# Patient Record
Sex: MALE | Race: WHITE
[De-identification: names, ages, dates, MRNs, and addresses within clinical notes are randomized per-mention and may not be internally consistent; named-entity substitution may affect disease eponyms.]

---

## 2019-04-23 LAB
ALBUMIN SERPL-MCNC: 3.7 G/DL (ref 3.5–5)
ALBUMIN/GLOB SERPL: 1.2 {RATIO} (ref 0.8–2)
ALP SERPL-CCNC: 91 IU/L (ref 40–150)
ALT SERPL-CCNC: 23 IU/L (ref 0–55)
ANION GAP SERPL CALC-SCNC: 10.2 MMOL/L (ref 8–16)
BASOPHILS # BLD AUTO: 0 10*3/UL (ref 0–0.1)
BASOPHILS NFR BLD AUTO: 0.4 % (ref 0–1)
BUN SERPL-MCNC: 18 MG/DL (ref 7–26)
BUN/CREAT SERPL: 16 (ref 6–25)
CALCIUM SERPL-MCNC: 9.3 MG/DL (ref 8.4–10.2)
CHLORIDE SERPL-SCNC: 104 MMOL/L (ref 98–107)
CO2 SERPL-SCNC: 25 MMOL/L (ref 22–29)
DEPRECATED APTT PLAS QN: 26.9 SECONDS (ref 23.8–35.5)
DEPRECATED INR PLAS: 1
DEPRECATED NEUTROPHILS # BLD AUTO: 5 10*3/UL (ref 2.1–6.9)
EGFRCR SERPLBLD CKD-EPI 2021: > 60 ML/MIN (ref 60–?)
EOSINOPHIL # BLD AUTO: 0.1 10*3/UL (ref 0–0.4)
EOSINOPHIL NFR BLD AUTO: 1.5 % (ref 0–6)
ERYTHROCYTE [DISTWIDTH] IN CORD BLOOD: 13.6 % (ref 11.7–14.4)
GLOBULIN PLAS-MCNC: 3.2 G/DL (ref 2.3–3.5)
GLUCOSE SERPLBLD-MCNC: 229 MG/DL (ref 74–118)
HCT VFR BLD AUTO: 38.8 % (ref 38.2–49.6)
HGB BLD-MCNC: 13.6 G/DL (ref 14–18)
LYMPHOCYTES # BLD: 1.4 10*3/UL (ref 1–3.2)
LYMPHOCYTES NFR BLD AUTO: 19.5 % (ref 18–39.1)
MCH RBC QN AUTO: 30.4 PG (ref 28–32)
MCHC RBC AUTO-ENTMCNC: 35.1 G/DL (ref 31–35)
MCV RBC AUTO: 86.6 FL (ref 81–99)
MONOCYTES # BLD AUTO: 0.5 10*3/UL (ref 0.2–0.8)
MONOCYTES NFR BLD AUTO: 7.6 % (ref 4.4–11.3)
NEUTS SEG NFR BLD AUTO: 70.7 % (ref 38.7–80)
PLATELET # BLD AUTO: 174 X10E3/UL (ref 140–360)
POTASSIUM SERPL-SCNC: 4.2 MMOL/L (ref 3.5–5.1)
PROTHROMBIN TIME: 13.7 SECONDS (ref 11.9–14.5)
RBC # BLD AUTO: 4.48 X10E6/UL (ref 4.3–5.7)
SODIUM SERPL-SCNC: 135 MMOL/L (ref 136–145)

## 2019-04-26 ENCOUNTER — HOSPITAL ENCOUNTER (OUTPATIENT)
Dept: HOSPITAL 88 - OR | Age: 64
Setting detail: OBSERVATION
LOS: 1 days | Discharge: HOME | End: 2019-04-27
Attending: UROLOGY | Admitting: UROLOGY
Payer: COMMERCIAL

## 2019-04-26 VITALS — DIASTOLIC BLOOD PRESSURE: 61 MMHG | SYSTOLIC BLOOD PRESSURE: 101 MMHG

## 2019-04-26 VITALS — DIASTOLIC BLOOD PRESSURE: 87 MMHG | SYSTOLIC BLOOD PRESSURE: 156 MMHG

## 2019-04-26 VITALS — SYSTOLIC BLOOD PRESSURE: 141 MMHG | DIASTOLIC BLOOD PRESSURE: 76 MMHG

## 2019-04-26 VITALS — WEIGHT: 277.44 LBS | BODY MASS INDEX: 42.05 KG/M2 | HEIGHT: 68 IN

## 2019-04-26 VITALS — SYSTOLIC BLOOD PRESSURE: 165 MMHG | DIASTOLIC BLOOD PRESSURE: 90 MMHG

## 2019-04-26 DIAGNOSIS — R35.1: ICD-10-CM

## 2019-04-26 DIAGNOSIS — I83.893: ICD-10-CM

## 2019-04-26 DIAGNOSIS — E78.00: ICD-10-CM

## 2019-04-26 DIAGNOSIS — N40.3: Primary | ICD-10-CM

## 2019-04-26 DIAGNOSIS — R39.2: ICD-10-CM

## 2019-04-26 DIAGNOSIS — I48.91: ICD-10-CM

## 2019-04-26 DIAGNOSIS — M19.90: ICD-10-CM

## 2019-04-26 PROCEDURE — 82948 REAGENT STRIP/BLOOD GLUCOSE: CPT

## 2019-04-26 PROCEDURE — 71046 X-RAY EXAM CHEST 2 VIEWS: CPT

## 2019-04-26 PROCEDURE — 93005 ELECTROCARDIOGRAM TRACING: CPT

## 2019-04-26 PROCEDURE — 80053 COMPREHEN METABOLIC PANEL: CPT

## 2019-04-26 PROCEDURE — 76872 US TRANSRECTAL: CPT

## 2019-04-26 PROCEDURE — 55700: CPT

## 2019-04-26 PROCEDURE — 85025 COMPLETE CBC W/AUTO DIFF WBC: CPT

## 2019-04-26 PROCEDURE — 85610 PROTHROMBIN TIME: CPT

## 2019-04-26 PROCEDURE — 36415 COLL VENOUS BLD VENIPUNCTURE: CPT

## 2019-04-26 PROCEDURE — 88305 TISSUE EXAM BY PATHOLOGIST: CPT

## 2019-04-26 PROCEDURE — 85730 THROMBOPLASTIN TIME PARTIAL: CPT

## 2019-04-26 RX ADMIN — METRONIDAZOLE SCH MG: 500 TABLET ORAL at 21:01

## 2019-04-26 RX ADMIN — CEFOXITIN SODIUM SCH MLS/HR: 1 INJECTION, SOLUTION INTRAVENOUS at 18:45

## 2019-04-26 RX ADMIN — INSULIN HUMAN SCH UNIT: 100 INJECTION, SOLUTION PARENTERAL at 20:45

## 2019-04-26 RX ADMIN — LABETALOL HYDROCHLORIDE PRN MG: 5 INJECTION, SOLUTION INTRAVENOUS at 21:04

## 2019-04-26 RX ADMIN — SODIUM CHLORIDE, POTASSIUM CHLORIDE, SODIUM LACTATE AND CALCIUM CHLORIDE SCH MLS/HR: 600; 310; 30; 20 INJECTION, SOLUTION INTRAVENOUS at 22:33

## 2019-04-26 RX ADMIN — METRONIDAZOLE SCH MG: 500 TABLET ORAL at 15:52

## 2019-04-26 NOTE — OPERATIVE REPORT
DATE OF PROCEDURE:  04/26/2019

 

SURGEON:  Leroy Rain MD

 

PREOPERATIVE DIAGNOSIS:  Elevated prostate-specific antigen.

 

POSTOPERATIVE DIAGNOSIS:  Elevated prostate-specific antigen.

 

OPERATION PERFORMED:  Ultrasound-directed transrectal prostate biopsy.

 

ANESTHESIA:  IV sedation monitored anesthesia care.

 

INDICATIONS:  This patient is a 63-year-old white male, who had a prostate-specific

antigen of 9.2 and was referred for further evaluation and treatment.  Physical

examination revealed a moderately obstructing prostate, a moderately trabeculated

bladder and a nodular prostate.  The patient has a history of atrial fibrillation, had

cardiac clearance from Dr. Angeles.  He had been on Eliquis and Eliquis was stopped five

days prior to the surgery.  For further details, please refer to the history and

physical.  The procedure was done in following fashion. 

 

DESCRIPTION OF THE PROCEDURE:  The patient was taken to the operating room, placed under

IV sedation, monitored anesthesia care in the lateral decubitus position with the right

side up.  An ultrasound scan of the prostate was performed.  This revealed about 26 g

prostate.  Internal calcifications were identified.  The seminal vesicles were

identified and appeared normal.  Sextant biopsies of prostate were performed using the

Bard biopsy gun through the ultrasound probe.  Specimens were taken from the right base

lateral, then right base medial, then right mid lateral, then right mid medial, then

right apex lateral, then right apex medial, then left base lateral, then left base

medial, then left mid lateral, then left mid medial, then left apex lateral, then left

apex medial.  Once this was accomplished, the ultrasound probe was removed.  The patient

was returned to supine position.  A 16-Slovenian Ansari catheter inserted.  The patient

tolerated the procedure well and left the operating room in good condition.  My plan is

to watch the patient overnight on IV antibiotics and Dr. Chago Eddy is being consulted

for medical management in view of his multiple medical problems. 

 

 

 

 

______________________________

Leroy Rain MD

 

THOR/MODL

D:  04/26/2019 12:31:05

T:  04/26/2019 21:56:44

Job #:  [id number]

## 2019-04-26 NOTE — XMS REPORT
Clinical Summary

                             Created on: 2019



Mirza Morley

External Reference #: [id number]

: 1955

Sex: Male



Demographics







                          Address                   [address] 

[place]  [address]

 

                          Home Phone                [phone]

 

                          Preferred Language        English

 

                          Marital Status            

 

                          Jehovah's witness Affiliation     Unknown

 

                          Race                      White

 

                          Ethnic Group              Non-





Author







                          Author                    [employer]

 

                          Organization              [employer]

 

                          Address                   Unknown

 

                          Phone                     Unavailable







Support







                Name            Relationship    Address         Phone

 

                    Leilani Morley     [address] 

[place]  [address]                       [phone]







Care Team Providers







                    Care Team Member Name    Role                Phone

 

                    Corby Tilley MD    PCP                 Unavailable







Allergies

No Known Allergies



Medications







                          End Date                  Status



              Medication     Sig          Dispensed     Refills      Start  



                                         Date  

 

                                                    Active



                     KLOR-CON M20 20 mEq CR       0                   02/10/201  



                           tablet                    7  

 

                                                    Active



                     tamsulosin (FLOMAX) 0.4       1                   03/15/201  



                           mg capsule,extended        7  



                                         release 24hr      

 

                                                    Active



                     aspirin (ECOTRIN) 81 MG     Take 81 mg by       0   



                           enteric coated tablet     mouth daily.     

 

                                                    Active



                     magnesium gluconate     Take 500 mg         0   



                           (MAGONATE) 500 mg tablet     by mouth     



                           tablet                    daily.     

 

                                                    Active



                 CARTIA  mg 24 hr     Take 1          3                 



                     capsule             capsule by          7  



                                         mouth daily.     

 

                                                    Active



                     b complex vitamins (B     Take 1 tablet       0   



                           COMPLEX 1) tablet         by mouth     



                                         daily.     

 

                                                    Active



              apixaban (ELIQUIS) 5 mg     Take 1 tablet     60 tablet     5              



                     tablet              (5 mg total)        7  



                                         by mouth 2     



                                         (two) times a     



                                         day.     

 

                                                    Active



              metFORMIN XR     Take 1 tablet     30 tablet     5              



                     (GLUCOPHAGE-XR) 750 mg 24     (750 mg             7  



                           hr tablet                 total) by     



                                         mouth daily.     

 

                          2018                



              atorvastatin (LIPITOR) 40     Take 1 tablet     90 tablet     1              



                     MG tablet           (40 mg total)       7  



                                         by mouth     



                                         daily.     

 

                          2018                



              furosemide (LASIX) 20 mg     Take 1 tablet     90 tablet     1              



                     tablet              (20 mg total)       7  



                                         by mouth     



                                         daily.     







Active Problems







 



                           Problem                   Noted Date

 

 



                           Paroxysmal atrial fibrillation     2017

 

 



                           Benign hypertensive heart disease without CHF     2017

 

 



                           Pure hypercholesterolemia     2017

 

 



                           Type 2 diabetes mellitus without complication, without long-term current     2017





                                         use of insulin 

 

 



                           Influenza vaccination administered at current visit     2017

 

 



                           Abnormal PSA              2017

 

 



                           Pneumococcal vaccination administered at current visit     2017

 

 



                           Colon cancer screening     2017







Immunizations







  



                     Name                Dates Previously Given     Next Due

 

  



                           INFLUENZA QUAD PF         2017 

 

  



                           Pneumococcal              2017 



                                         Polysaccharide  







Family History







   



                 Medical History     Relation        Name            Comments

 

   



                           Colon cancer              Brother  

 

   



                           Prostate cancer           Brother  

 

   



                           Stroke                    Mother  

 

   



                           Breast cancer             Sister  









   



                 Relation        Name            Status          Comments

 

   



                           Brother                   Alive 

 

   



                           Father                     

 

   



                           Mother                     

 

   



                           Sister                    Alive 







Social History







                                        Date



                 Tobacco Use     Types           Packs/Day       Years Used 

 

                                         



                                         Never Smoker    

 

    



                           Smokeless Tobacco:        Snuff  



                                         Current User   









   



                 Alcohol Use     Drinks/Week     oz/Week         Comments

 

   



                           Yes                       [facility]









 



                           Sex Assigned at Birth     Date Recorded

 

 



                                         Not on file 









                                        Industry



                           Job Start Date            Occupation 

 

                                        Not on file



                           Not on file               Not on file 









                                        Travel End



                           Travel History            Travel Start 

 





                                         No recent travel history available.







Last Filed Vital Signs

Not on file



Plan of Treatment







   



                 Health Maintenance     Due Date        Last Done       Comments

 

   



                           DIABETIC RETINAL EYE EXAM     1955  

 

   



                           DIABETIC FOOT EXAM        1965  

 

   



                           COLON CANCER SCREENING     2005  

 

   



                           SHINGLES VACCINES (#1)     2005  

 

   



                     URINE MICROALBUMIN     09/15/2018          09/15/2017 

 

   



                     INFLUENZA VACCINE     2019 







Results

Not on fileafter 2018



Insurance







     



            Payer      Benefit     Subscriber ID     Type       Phone      Address



                                         Plan /    



                                         Group    

 

     



                 BCBS            BCBS            xxxxxxxxxxxx     PPO  



                                         CHOICE    



                                         PPO/[employer] PPO    









     



            Guarantor Name     Account     Relation to     Date of     Phone      Billing Address



                     Type                Patient             Birth  

 

     



            Mirza Morley     Personal/F     Self       1955     [phone]     [address] [place] [address]               (Home)              [place] [address] [phone] 



                                         (Work) 







Advance Directives





Patient has advance care planning documents on file. For more information, karsten haile contact:



Hitesh Beaver



[address] [place] [address]

## 2019-04-26 NOTE — XMS REPORT
Clinical Summary

                             Created on: 2019



Mirza Morley

External Reference #: [id number]

: 1955

Sex: Male



Demographics







                          Address                   [address] 

[place]  [address]

 

                          Home Phone                [phone]

 

                          Preferred Language        English

 

                          Marital Status            

 

                          Caodaism Affiliation     Unknown

 

                          Race                      White

 

                          Ethnic Group              Non-





Author







                          Author                    [facility] [employer]

 

                          Organization              [facility] [employer]

 

                          Address                   Unknown

 

                          Phone                     Unavailable







Support







                Name            Relationship    Address         Phone

 

                    Leilani Morley     [address] 

[place]  [address]                       [phone]







Care Team Providers







                    Care Team Member Name    Role                Phone

 

                    Corby Tilley MD    PCP                 Unavailable







Allergies

No Known Allergies



Medications







                          End Date                  Status



              Medication     Sig          Dispensed     Refills      Start  



                                         Date  

 

                                                    Active



                     KLOR-CON M20 20 mEq CR       0                   02/10/201  



                           tablet                    7  

 

                                                    Active



                     tamsulosin (FLOMAX) 0.4       1                   03/15/201  



                           mg capsule,extended        7  



                                         release 24hr      

 

                                                    Active



                     aspirin (ECOTRIN) 81 MG     Take 81 mg by       0   



                           enteric coated tablet     mouth daily.     

 

                                                    Active



                     magnesium gluconate     Take 500 mg         0   



                           (MAGONATE) 500 mg tablet     by mouth     



                           tablet                    daily.     

 

                                                    Active



                 CARTIA  mg 24 hr     Take 1          3                 



                     capsule             capsule by          7  



                                         mouth daily.     

 

                                                    Active



                     b complex vitamins (B     Take 1 tablet       0   



                           COMPLEX 1) tablet         by mouth     



                                         daily.     

 

                                                    Active



              apixaban (ELIQUIS) 5 mg     Take 1 tablet     60 tablet     5              



                     tablet              (5 mg total)        7  



                                         by mouth 2     



                                         (two) times a     



                                         day.     

 

                                                    Active



              metFORMIN XR     Take 1 tablet     30 tablet     5              



                     (GLUCOPHAGE-XR) 750 mg 24     (750 mg             7  



                           hr tablet                 total) by     



                                         mouth daily.     

 

                          2018                



              atorvastatin (LIPITOR) 40     Take 1 tablet     90 tablet     1              



                     MG tablet           (40 mg total)       7  



                                         by mouth     



                                         daily.     

 

                          2018                



              furosemide (LASIX) 20 mg     Take 1 tablet     90 tablet     1              



                     tablet              (20 mg total)       7  



                                         by mouth     



                                         daily.     







Active Problems







 



                           Problem                   Noted Date

 

 



                           Paroxysmal atrial fibrillation     2017

 

 



                           Benign hypertensive heart disease without CHF     2017

 

 



                           Pure hypercholesterolemia     2017

 

 



                           Type 2 diabetes mellitus without complication, without long-term current     2017





                                         use of insulin 

 

 



                           Influenza vaccination administered at current visit     2017

 

 



                           Abnormal PSA              2017

 

 



                           Pneumococcal vaccination administered at current visit     2017

 

 



                           Colon cancer screening     2017







Immunizations







  



                     Name                Dates Previously Given     Next Due

 

  



                           INFLUENZA QUAD PF         2017 

 

  



                           Pneumococcal              2017 



                                         Polysaccharide  







Family History







   



                 Medical History     Relation        Name            Comments

 

   



                           Colon cancer              Brother  

 

   



                           Prostate cancer           Brother  

 

   



                           Stroke                    Mother  

 

   



                           Breast cancer             Sister  









   



                 Relation        Name            Status          Comments

 

   



                           Brother                   Alive 

 

   



                           Father                     

 

   



                           Mother                     

 

   



                           Sister                    Alive 







Social History







                                        Date



                 Tobacco Use     Types           Packs/Day       Years Used 

 

                                         



                                         Never Smoker    

 

    



                           Smokeless Tobacco:        Snuff  



                                         Current User   









   



                 Alcohol Use     Drinks/Week     oz/Week         Comments

 

   



                           Yes                       [facility]









 



                           Sex Assigned at Birth     Date Recorded

 

 



                                         Not on file 









                                        Industry



                           Job Start Date            Occupation 

 

                                        Not on file



                           Not on file               Not on file 









                                        Travel End



                           Travel History            Travel Start 

 





                                         No recent travel history available.







Last Filed Vital Signs

Not on file



Plan of Treatment







   



                 Health Maintenance     Due Date        Last Done       Comments

 

   



                           DIABETIC RETINAL EYE EXAM     1955  

 

   



                           DIABETIC FOOT EXAM        1965  

 

   



                           COLON CANCER SCREENING     2005  

 

   



                           SHINGLES VACCINES (#1)     2005  

 

   



                     URINE MICROALBUMIN     09/15/2018          09/15/2017 

 

   



                     INFLUENZA VACCINE     2019 







Results

Not on fileafter 2018



Insurance







     



            Payer      Benefit     Subscriber ID     Type       Phone      Address



                                         Plan /    



                                         Group    

 

     



                 BCBS            BCBS            xxxxxxxxxxxx     PPO  



                                         CHOICE    



                                         PPO/[employer] PPO    









     



            Guarantor Name     Account     Relation to     Date of     Phone      Billing Address



                     Type                Patient             Birth  

 

     



            Mirza Morley     Personal/F     Self       1955     [phone]     [address] [place] [address]               (Home)              [place] [address] [phone] 



                                         (Work) 







Advance Directives





Patient has advance care planning documents on file. For more information, karsten haile contact:



[facility] Beaver



[address] [place] [address]

## 2019-04-26 NOTE — XMS REPORT
Patient Summary Document

                             Created on: 2019



KOREY CHRISTIANSON

External Reference #: [id number]

: 1955

Sex: Male



Demographics







                          Address                   [address] [place] [address] [address] [place] [address]  [address]

 

                          Home Phone                [phone] hm

 

                          Preferred Language        Unknown

 

                          Marital Status            Unknown

 

                          Jehovah's witness Affiliation     Unknown

 

                          Race                      Unknown

 

                                        Additional Race(s)  

 

                          Ethnic Group              Unknown





Author







                          Author                    [facility]

 

                          [facility]

 

                          Address                   Unknown

 

                          Phone                     Unavailable







Care Team Providers







                    Care Team Member Name    Role                Phone

 

                    LORENZO TRAORE      Unavailable         Unavailable







Problems

This patient has no known problems.



Allergies, Adverse Reactions, Alerts

This patient has no known allergies or adverse reactions.



Medications

This patient has no known medications.



Results







           Test Description    Test Time    Test Comments    Text Results    Atomic Results    Result

 Comments

 

                CHEST 2 VIEWS    2019 12:02:00                                                             

                                             [place] [address]  
   Patient Name: KOREY CHRISTIANSON                                   MR #:
[id number]                     : 1955                                  
Age/Sex: 63/M  Acct #: [id number]                              Req #: [id number]
[id number]  Adm Physician:                                                      
Ordered by: LORENZO TRAORE MD                            Report #: [id number]     
  Location: OR                                      Room/Bed:                   
 
___________________________________________________________________________________________________
   Procedure: [id number] DX/CHEST 2 VIEWS  Exam Date: 19                   
        Exam Time: 1135                                              REPORT 
STATUS: Signed    EXAM: CHEST 2 VIEWS, PA and lateral   DATE: 2019  Time 
stamp on exam: 11:34 AM   INDICATION: Preoperative   COMPARISON: None      
FINDINGS:   LINES/TUBES: None      LUNGS: No consolidations or edema.       
PLEURA: No effusions or pneumothorax. Pleural calcification in the left   
diaphragmatic pleura.      HEART AND MEDIASTINUM: Normal size and contour.      
BONES AND SOFT TISSUES: No acute findings. Degenerative changes of the spine.   
  IMPRESSION:   No acute thoracic abnormality.                  Signed by: Dr. Seun Veronica DO on 2019 12:03 PM        Dictated By: SEUN VERONICA DO  
Electronically Signed By: SEUN VERONICA DO on 19 1203  Transcribed By: 
STANLEY on 19 1203       COPY TO:   LORENZO TRAORE MD

## 2019-04-26 NOTE — NUR
Called and talked to Dr. Eddy about patient sugar being 217 and blood pressure of 165/90. 
Dr. Eddy order Humulin R sliding scale low dose and labetalol 5 mg

## 2019-04-26 NOTE — NUR
Recvd patient from PACU, AAOx3, Assisted him to bed, not in any distress, Ansari's catheter 
is intact, on IV fluids. call light in reach, keep monitoring

## 2019-04-26 NOTE — NUR
Medical consultation



Requesting physician: 

Reason: med mgmt

cc: elevated PSA 

HPI: 63yoM, PCP Dr.Gregory Tilley, underwent prostate bx by  for 
elevated PSA with urinary frequency and recurrent prostate infections.  
Currently pain 2/10.  NO other symptoms. Pt last took eliquis 6 days ago.



PMH: HTN, HLD, A.fib, elevated PSA 9.2, prostatitis, urianary frequency, 
cellulitis, Obesity, DM2, OA

PSHx: left knee arthroscopy, colonoscopy

Allergies; see emr

Fh/SH; ; no cigs/illicits.  Prostate CA in brother; breast cancer in 
sister; colon cancer in brother, and lung CA/mesothelioma/asbestosis in 
father; 

Meds; see MAR

ROS: no f/c/s/N/V/D/GOSS/vision changes/cp/sob/back pain/skin rash/



v/s; rev'd

PE

tired appearing

anicteric

ns1s2

mod bs

soft nt nd

JUAREZ with pink-colored urine

no e/t

a&ox3; mao

skin dry

n. affect



labs/med; rev'd



A/P: 63yoM

Elevated PSA

BPH

A.fib

HTN

HLD

Obesity 

DM2



PLAN

f/u bx results

resume home meds except eliquis; plan to restart in about 3 days (should be 
home then)

hba1c/lipids

PT consult

f/u labs; will need ADA diet



Chago Eddy MD, PhD.

## 2019-04-27 VITALS — SYSTOLIC BLOOD PRESSURE: 138 MMHG | DIASTOLIC BLOOD PRESSURE: 91 MMHG

## 2019-04-27 VITALS — SYSTOLIC BLOOD PRESSURE: 140 MMHG | DIASTOLIC BLOOD PRESSURE: 94 MMHG

## 2019-04-27 VITALS — SYSTOLIC BLOOD PRESSURE: 154 MMHG | DIASTOLIC BLOOD PRESSURE: 81 MMHG

## 2019-04-27 VITALS — SYSTOLIC BLOOD PRESSURE: 173 MMHG | DIASTOLIC BLOOD PRESSURE: 94 MMHG

## 2019-04-27 VITALS — DIASTOLIC BLOOD PRESSURE: 94 MMHG | SYSTOLIC BLOOD PRESSURE: 173 MMHG

## 2019-04-27 RX ADMIN — SODIUM CHLORIDE, POTASSIUM CHLORIDE, SODIUM LACTATE AND CALCIUM CHLORIDE SCH MLS/HR: 600; 310; 30; 20 INJECTION, SOLUTION INTRAVENOUS at 09:11

## 2019-04-27 RX ADMIN — CEFOXITIN SODIUM SCH MLS/HR: 1 INJECTION, SOLUTION INTRAVENOUS at 00:24

## 2019-04-27 RX ADMIN — INSULIN HUMAN SCH UNIT: 100 INJECTION, SOLUTION PARENTERAL at 09:07

## 2019-04-27 RX ADMIN — LABETALOL HYDROCHLORIDE PRN MG: 5 INJECTION, SOLUTION INTRAVENOUS at 09:29

## 2019-04-27 RX ADMIN — METRONIDAZOLE SCH MG: 500 TABLET ORAL at 09:11

## 2019-04-27 RX ADMIN — CEFOXITIN SODIUM SCH MLS/HR: 1 INJECTION, SOLUTION INTRAVENOUS at 06:00

## 2019-04-27 NOTE — NUR
SOCIAL WORK INITIAL ASSESSMENT 

 to bedside to discuss plan of care with patient/family. CM/SW role and care 
transitions discussed. Anticipated discharge plan discussed along with duration of care. 
CM/SW discussed patients right to make decisions in care.  CM/SW work hours given.  

Patient lives: IN OWN HOUSE WITH FAMILY

Admit/Transfer: VIA ED

POA/Emergency contact: WIFE CAROLYN [phone]

Current/Previous Home Health: NONE

PCP/Follow-up Care: [facility]

Current/Previous DME: NONE

Other Services: NONE

Employment Status: [employer]

Areas of Concerns: DIPS DAILY

Referral Needs: NONE

Education Needs: NONE

IMM/MOON given and signed (if applicable): NA

Goal for discharge: RETURN HOME

CM/SW left business card at the bedside with contact information. Name and number was also 
written on the patients whiteboard. Patient verbalized understanding of discussion. CM will 
follow-up with ongoing discharge and transition of care needs.

## 2025-01-21 NOTE — DIAGNOSTIC IMAGING REPORT
EXAM: CHEST 2 VIEWS, PA and lateral

DATE: 4/23/2019  Time stamp on exam: 11:34 AM

INDICATION: Preoperative

COMPARISON: None



FINDINGS:

LINES/TUBES: None



LUNGS: No consolidations or edema. 



PLEURA: No effusions or pneumothorax. Pleural calcification in the left

diaphragmatic pleura.



HEART AND MEDIASTINUM: Normal size and contour.



BONES AND SOFT TISSUES: No acute findings. Degenerative changes of the spine.



IMPRESSION:

No acute thoracic abnormality.











Signed by: Dr. 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  This was seen on her CT scan in September.  This really should be checked annually.